# Patient Record
Sex: FEMALE | Race: OTHER | URBAN - METROPOLITAN AREA
[De-identification: names, ages, dates, MRNs, and addresses within clinical notes are randomized per-mention and may not be internally consistent; named-entity substitution may affect disease eponyms.]

---

## 2022-12-16 ENCOUNTER — LAB ENCOUNTER (OUTPATIENT)
Dept: LAB | Age: 52
End: 2022-12-16
Attending: FAMILY MEDICINE
Payer: COMMERCIAL

## 2022-12-16 ENCOUNTER — OFFICE VISIT (OUTPATIENT)
Dept: FAMILY MEDICINE CLINIC | Facility: CLINIC | Age: 52
End: 2022-12-16
Payer: COMMERCIAL

## 2022-12-16 ENCOUNTER — TELEPHONE (OUTPATIENT)
Dept: FAMILY MEDICINE CLINIC | Facility: CLINIC | Age: 52
End: 2022-12-16

## 2022-12-16 VITALS
BODY MASS INDEX: 31.05 KG/M2 | SYSTOLIC BLOOD PRESSURE: 114 MMHG | HEIGHT: 65 IN | WEIGHT: 186.38 LBS | HEART RATE: 57 BPM | TEMPERATURE: 97 F | RESPIRATION RATE: 16 BRPM | DIASTOLIC BLOOD PRESSURE: 72 MMHG | OXYGEN SATURATION: 96 %

## 2022-12-16 DIAGNOSIS — Z86.73 HISTORY OF ISCHEMIC STROKE: ICD-10-CM

## 2022-12-16 DIAGNOSIS — Z12.31 ENCOUNTER FOR SCREENING MAMMOGRAM FOR BREAST CANCER: ICD-10-CM

## 2022-12-16 DIAGNOSIS — Z85.9 HISTORY OF CANCER: Primary | ICD-10-CM

## 2022-12-16 DIAGNOSIS — Z00.00 ANNUAL PHYSICAL EXAM: ICD-10-CM

## 2022-12-16 DIAGNOSIS — Z12.11 SCREENING FOR COLON CANCER: ICD-10-CM

## 2022-12-16 LAB
ALBUMIN SERPL-MCNC: 3.4 G/DL (ref 3.4–5)
ALBUMIN/GLOB SERPL: 0.7 {RATIO} (ref 1–2)
ALP LIVER SERPL-CCNC: 96 U/L
ALT SERPL-CCNC: 23 U/L
ANION GAP SERPL CALC-SCNC: 3 MMOL/L (ref 0–18)
AST SERPL-CCNC: 13 U/L (ref 15–37)
BASOPHILS # BLD AUTO: 0.05 X10(3) UL (ref 0–0.2)
BASOPHILS NFR BLD AUTO: 0.5 %
BILIRUB SERPL-MCNC: 0.5 MG/DL (ref 0.1–2)
BUN BLD-MCNC: 12 MG/DL (ref 7–18)
BUN/CREAT SERPL: 16.2 (ref 10–20)
CALCIUM BLD-MCNC: 8.8 MG/DL (ref 8.5–10.1)
CHLORIDE SERPL-SCNC: 107 MMOL/L (ref 98–112)
CHOLEST SERPL-MCNC: 155 MG/DL (ref ?–200)
CO2 SERPL-SCNC: 31 MMOL/L (ref 21–32)
CREAT BLD-MCNC: 0.74 MG/DL
DEPRECATED RDW RBC AUTO: 45.1 FL (ref 35.1–46.3)
EOSINOPHIL # BLD AUTO: 0.41 X10(3) UL (ref 0–0.7)
EOSINOPHIL NFR BLD AUTO: 4.5 %
ERYTHROCYTE [DISTWIDTH] IN BLOOD BY AUTOMATED COUNT: 13.7 % (ref 11–15)
FASTING PATIENT LIPID ANSWER: YES
FASTING STATUS PATIENT QL REPORTED: YES
GFR SERPLBLD BASED ON 1.73 SQ M-ARVRAT: 97 ML/MIN/1.73M2 (ref 60–?)
GLOBULIN PLAS-MCNC: 4.6 G/DL (ref 2.8–4.4)
GLUCOSE BLD-MCNC: 89 MG/DL (ref 70–99)
HCT VFR BLD AUTO: 43.9 %
HDLC SERPL-MCNC: 41 MG/DL (ref 40–59)
HGB BLD-MCNC: 14.2 G/DL
IMM GRANULOCYTES # BLD AUTO: 0.02 X10(3) UL (ref 0–1)
IMM GRANULOCYTES NFR BLD: 0.2 %
LDLC SERPL CALC-MCNC: 93 MG/DL (ref ?–100)
LYMPHOCYTES # BLD AUTO: 2.07 X10(3) UL (ref 1–4)
LYMPHOCYTES NFR BLD AUTO: 22.5 %
MCH RBC QN AUTO: 29.1 PG (ref 26–34)
MCHC RBC AUTO-ENTMCNC: 32.3 G/DL (ref 31–37)
MCV RBC AUTO: 90 FL
MONOCYTES # BLD AUTO: 0.67 X10(3) UL (ref 0.1–1)
MONOCYTES NFR BLD AUTO: 7.3 %
NEUTROPHILS # BLD AUTO: 5.97 X10 (3) UL (ref 1.5–7.7)
NEUTROPHILS # BLD AUTO: 5.97 X10(3) UL (ref 1.5–7.7)
NEUTROPHILS NFR BLD AUTO: 65 %
NONHDLC SERPL-MCNC: 114 MG/DL (ref ?–130)
OSMOLALITY SERPL CALC.SUM OF ELEC: 291 MOSM/KG (ref 275–295)
PLATELET # BLD AUTO: 272 10(3)UL (ref 150–450)
POTASSIUM SERPL-SCNC: 3.7 MMOL/L (ref 3.5–5.1)
PROT SERPL-MCNC: 8 G/DL (ref 6.4–8.2)
RBC # BLD AUTO: 4.88 X10(6)UL
SODIUM SERPL-SCNC: 141 MMOL/L (ref 136–145)
TRIGL SERPL-MCNC: 114 MG/DL (ref 30–149)
TSI SER-ACNC: 4.64 MIU/ML (ref 0.36–3.74)
VLDLC SERPL CALC-MCNC: 19 MG/DL (ref 0–30)
WBC # BLD AUTO: 9.2 X10(3) UL (ref 4–11)

## 2022-12-16 PROCEDURE — 85025 COMPLETE CBC W/AUTO DIFF WBC: CPT | Performed by: FAMILY MEDICINE

## 2022-12-16 PROCEDURE — 80061 LIPID PANEL: CPT | Performed by: FAMILY MEDICINE

## 2022-12-16 PROCEDURE — 99213 OFFICE O/P EST LOW 20 MIN: CPT | Performed by: FAMILY MEDICINE

## 2022-12-16 PROCEDURE — 3008F BODY MASS INDEX DOCD: CPT | Performed by: FAMILY MEDICINE

## 2022-12-16 PROCEDURE — 36415 COLL VENOUS BLD VENIPUNCTURE: CPT | Performed by: FAMILY MEDICINE

## 2022-12-16 PROCEDURE — 99386 PREV VISIT NEW AGE 40-64: CPT | Performed by: FAMILY MEDICINE

## 2022-12-16 PROCEDURE — 3078F DIAST BP <80 MM HG: CPT | Performed by: FAMILY MEDICINE

## 2022-12-16 PROCEDURE — 3074F SYST BP LT 130 MM HG: CPT | Performed by: FAMILY MEDICINE

## 2022-12-16 PROCEDURE — 84443 ASSAY THYROID STIM HORMONE: CPT | Performed by: FAMILY MEDICINE

## 2022-12-16 PROCEDURE — 80053 COMPREHEN METABOLIC PANEL: CPT | Performed by: FAMILY MEDICINE

## 2022-12-16 RX ORDER — FLUTICASONE PROPIONATE 110 UG/1
2 AEROSOL, METERED RESPIRATORY (INHALATION) 2 TIMES DAILY
Qty: 1 EACH | Refills: 1 | Status: SHIPPED | OUTPATIENT
Start: 2022-12-16 | End: 2023-12-11

## 2022-12-16 RX ORDER — ALBUTEROL SULFATE 90 UG/1
2 AEROSOL, METERED RESPIRATORY (INHALATION) EVERY 6 HOURS PRN
Qty: 1 EACH | Refills: 1 | Status: SHIPPED | OUTPATIENT
Start: 2022-12-16

## 2022-12-16 RX ORDER — MONTELUKAST SODIUM 10 MG/1
10 TABLET ORAL DAILY
Qty: 90 TABLET | Refills: 0 | Status: SHIPPED | OUTPATIENT
Start: 2022-12-16 | End: 2023-12-11

## 2022-12-16 RX ORDER — ASPIRIN 81 MG/1
81 TABLET ORAL DAILY
Qty: 90 TABLET | Refills: 3 | Status: SHIPPED | OUTPATIENT
Start: 2022-12-16

## 2022-12-16 NOTE — TELEPHONE ENCOUNTER
Spoke with pt,  verified  Pt was seen by Dr Malina Cardoso today, she wish to initiate mychart. angelcam activation sent, also provided [employer] # in case need further assistance.      SEHA

## 2022-12-28 ENCOUNTER — TELEMEDICINE (OUTPATIENT)
Dept: FAMILY MEDICINE CLINIC | Facility: CLINIC | Age: 52
End: 2022-12-28

## 2022-12-28 DIAGNOSIS — E03.9 HYPOTHYROIDISM, UNSPECIFIED TYPE: Primary | ICD-10-CM

## 2022-12-28 PROCEDURE — 99213 OFFICE O/P EST LOW 20 MIN: CPT | Performed by: FAMILY MEDICINE

## 2022-12-28 RX ORDER — LEVOTHYROXINE SODIUM 0.03 MG/1
25 TABLET ORAL
Qty: 90 TABLET | Refills: 3 | Status: SHIPPED | OUTPATIENT
Start: 2022-12-28

## 2023-02-01 ENCOUNTER — OFFICE VISIT (OUTPATIENT)
Dept: OBGYN CLINIC | Facility: CLINIC | Age: 53
End: 2023-02-01

## 2023-02-01 VITALS — WEIGHT: 189.81 LBS | DIASTOLIC BLOOD PRESSURE: 73 MMHG | BODY MASS INDEX: 32 KG/M2 | SYSTOLIC BLOOD PRESSURE: 130 MMHG

## 2023-02-01 DIAGNOSIS — Z90.710 HISTORY OF HYSTERECTOMY: ICD-10-CM

## 2023-02-01 DIAGNOSIS — Z87.410 HISTORY OF CERVICAL DYSPLASIA: ICD-10-CM

## 2023-02-01 DIAGNOSIS — Z01.419 WOMEN'S ANNUAL ROUTINE GYNECOLOGICAL EXAMINATION: Primary | ICD-10-CM

## 2023-02-01 PROCEDURE — 3075F SYST BP GE 130 - 139MM HG: CPT | Performed by: OBSTETRICS & GYNECOLOGY

## 2023-02-01 PROCEDURE — 3078F DIAST BP <80 MM HG: CPT | Performed by: OBSTETRICS & GYNECOLOGY

## 2023-02-01 PROCEDURE — 99386 PREV VISIT NEW AGE 40-64: CPT | Performed by: OBSTETRICS & GYNECOLOGY

## 2023-02-08 ENCOUNTER — HOSPITAL ENCOUNTER (OUTPATIENT)
Dept: CT IMAGING | Facility: HOSPITAL | Age: 53
Discharge: HOME | End: 2023-02-08
Attending: FAMILY MEDICINE
Payer: COMMERCIAL

## 2023-02-08 ENCOUNTER — HOSPITAL ENCOUNTER (OUTPATIENT)
Dept: MAMMOGRAPHY | Facility: HOSPITAL | Age: 53
Discharge: HOME | End: 2023-02-08
Attending: FAMILY MEDICINE
Payer: COMMERCIAL

## 2023-02-08 DIAGNOSIS — Z86.73 HISTORY OF ISCHEMIC STROKE: ICD-10-CM

## 2023-02-08 DIAGNOSIS — Z12.31 ENCOUNTER FOR SCREENING MAMMOGRAM FOR BREAST CANCER: ICD-10-CM

## 2023-02-08 PROCEDURE — 77063 BREAST TOMOSYNTHESIS BI: CPT | Performed by: FAMILY MEDICINE

## 2023-02-08 PROCEDURE — 70450 CT HEAD/BRAIN W/O DYE: CPT | Performed by: FAMILY MEDICINE

## 2023-02-08 PROCEDURE — 77067 SCR MAMMO BI INCL CAD: CPT | Performed by: FAMILY MEDICINE

## 2023-02-24 ENCOUNTER — NURSE ONLY (OUTPATIENT)
Facility: CLINIC | Age: 53
End: 2023-02-24

## 2023-02-24 NOTE — PROGRESS NOTES
Called patient for a scheduled telephone colon screening. Spoke with pt and she states having active GI sxs. Per department protocol oV scheduled with next available provider.  Date, time, location and provider discussed with her over the phone with the assistance of [address] [facility]  Malcom Doran [phone]

## 2023-03-06 RX ORDER — MONTELUKAST SODIUM 10 MG/1
10 TABLET ORAL DAILY
Qty: 90 TABLET | Refills: 3 | Status: SHIPPED | OUTPATIENT
Start: 2023-03-06

## 2023-03-06 RX ORDER — ALBUTEROL SULFATE 90 UG/1
2 AEROSOL, METERED RESPIRATORY (INHALATION) EVERY 6 HOURS PRN
Qty: 8.5 G | Refills: 3 | Status: SHIPPED | OUTPATIENT
Start: 2023-03-06

## 2023-03-06 RX ORDER — FLUTICASONE PROPIONATE 110 UG/1
2 AEROSOL, METERED RESPIRATORY (INHALATION) 2 TIMES DAILY
Qty: 3 EACH | Refills: 3 | Status: SHIPPED | OUTPATIENT
Start: 2023-03-06

## 2023-03-07 NOTE — TELEPHONE ENCOUNTER
Refill passed per [facility] protocol.      Requested Prescriptions   Pending Prescriptions Disp Refills    FLUTICASONE PROPIONATE 110 MCG/ACT Inhalation Aerosol [Pharmacy Med Name: FLUTICASONE HFA 110MCG ORAL INH] 12 g 0     Sig: INHALE 2 PUFFS INTO THE LUNGS TWICE DAILY       Asthma & COPD Medication Protocol Passed - 3/6/2023 10:53 AM        Passed - In person appointment or virtual visit in the past 6 mos or appointment in next 3 mos     Recent Outpatient Visits              1 week ago     [address], [address], [place]    Nurse Only    1 month ago [facility] annual routine gynecological examination    [address], [address], [place] - OB/GYN Sheldon Garcia MD    Office Visit    2 months ago Hypothyroidism, unspecified type    [facility], [address] [place] [address] Jaguar Rojas MD    Telemedicine    2 months ago History of cancer    [facility], Lin Arshad MD    Office Visit          Future Appointments         Provider Department Appt Notes    In 1 week Jaguar Rojas MD [address], [address] [facility] sight problems    In 3 months FÁTIMA Kwok [facility], [address] \"Failed TCS\" Heartburn/ becomes full quickly at meals/ loss of appetite                 ALBUTEROL 108 (90 Base) MCG/ACT Inhalation Aero Soln [Pharmacy Med Name: ALBUTEROL HFA INH (200 PUFFS)8.5GM] 8.5 g 0     Sig: INHALE 2 PUFFS INTO THE LUNGS EVERY 6 HOURS AS NEEDED FOR WHEEZING       Asthma & COPD Medication Protocol Passed - 3/6/2023 10:53 AM        Passed - In person appointment or virtual visit in the past 6 mos or appointment in next 3 mos     Recent Outpatient Visits              1 week ago     [address], [address], [address] Only    1 month ago [facility] annual routine gynecological examination    [employer], [address] [facility] Jung Dennis MD    Office Visit    2 months ago Hypothyroidism, unspecified type    [facility], [address] [facility] [place] Nicky Daniel MD    Telemedicine    2 months ago History of cancer    Bianka Dobbs, [facility] Ronan Nuñez MD    Office Visit          Future Appointments         Provider Department Appt Notes    In 1 week MD [facility]ka Glass, [address] [facility]    In 3 months FÁTIMA Kirkpatrick [facility], [address], [place] \"Failed TCS\" Heartburn/ becomes full quickly at meals/ loss of appetite                 MONTELUKAST 10 MG Oral Tab [Pharmacy Med Name: MONTELUKAST 10MG TABLETS] 90 tablet 0     Sig: TAKE 1 TABLET(10 MG) BY MOUTH DAILY       Asthma & COPD Medication Protocol Passed - 3/6/2023 10:53 AM        Passed - In person appointment or virtual visit in the past 6 mos or appointment in next 3 mos     Recent Outpatient Visits              1 week ago     Bianka Dobbs, [address], [place]    Nurse Only    1 month ago [employer] annual routine gynecological examination    [facility], [address] [facility] [address], [facility]N Jung Dennis MD    Office Visit    2 months ago Hypothyroidism, unspecified type    [facility], [address] [facility] [place] Nicky Daniel MD    Telemedicine    2 months ago History of cancer    Anny Hylton MD    Office Visit          Future Appointments         Provider Department Appt Notes    In 1 week MD Bianka Glass, [facility] Damion Nuñez sight problems    In 3 months FÁTIMA Kirkpatrick, [address] \"Failed TCS\" Heartburn/ becomes full quickly at meals/ loss of appetite                     Future Appointments Provider Department Appt Notes    In 1 week Geovany Phillips MD [address], [address] [facility] sight problems    In 3 months FÁTIMA [facility], [address] \"Failed TCS\" Heartburn/ becomes full quickly at meals/ loss of appetite             Recent Outpatient Visits              1 week ago     [address], [address], [place]    Nurse Only    1 month ago [facility] annual routine gynecological examination    [address], [address] [facility] Sheree Mendoza MD    Office Visit    2 months ago Hypothyroidism, unspecified type    Van Medrano MD    Telemedicine    2 months ago History of cancer    Van Medrano MD    Office Visit

## 2023-03-15 ENCOUNTER — OFFICE VISIT (OUTPATIENT)
Dept: FAMILY MEDICINE CLINIC | Facility: CLINIC | Age: 53
End: 2023-03-15

## 2023-03-15 VITALS
HEART RATE: 64 BPM | BODY MASS INDEX: 31.82 KG/M2 | OXYGEN SATURATION: 98 % | SYSTOLIC BLOOD PRESSURE: 122 MMHG | WEIGHT: 191 LBS | DIASTOLIC BLOOD PRESSURE: 66 MMHG | RESPIRATION RATE: 18 BRPM | HEIGHT: 65 IN

## 2023-03-15 DIAGNOSIS — H53.9 VISION DISORDER: Primary | ICD-10-CM

## 2023-03-15 DIAGNOSIS — Z87.898 HISTORY OF PARESTHESIA: ICD-10-CM

## 2023-03-15 PROCEDURE — 3008F BODY MASS INDEX DOCD: CPT | Performed by: FAMILY MEDICINE

## 2023-03-15 PROCEDURE — 99214 OFFICE O/P EST MOD 30 MIN: CPT | Performed by: FAMILY MEDICINE

## 2023-03-15 PROCEDURE — 3074F SYST BP LT 130 MM HG: CPT | Performed by: FAMILY MEDICINE

## 2023-03-15 PROCEDURE — 3078F DIAST BP <80 MM HG: CPT | Performed by: FAMILY MEDICINE

## 2023-04-03 ENCOUNTER — HOSPITAL ENCOUNTER (OUTPATIENT)
Age: 53
Discharge: HOME | End: 2023-04-03
Payer: COMMERCIAL

## 2023-04-03 VITALS
DIASTOLIC BLOOD PRESSURE: 78 MMHG | HEART RATE: 67 BPM | SYSTOLIC BLOOD PRESSURE: 127 MMHG | OXYGEN SATURATION: 100 % | RESPIRATION RATE: 18 BRPM | TEMPERATURE: 98 F

## 2023-04-03 DIAGNOSIS — J45.21 MILD INTERMITTENT ASTHMA WITH EXACERBATION: ICD-10-CM

## 2023-04-03 DIAGNOSIS — Z20.822 LAB TEST NEGATIVE FOR COVID-19 VIRUS: ICD-10-CM

## 2023-04-03 DIAGNOSIS — L50.9 URTICARIA: Primary | ICD-10-CM

## 2023-04-03 LAB
S PYO AG THROAT QL: NEGATIVE
SARS-COV-2 RNA RESP QL NAA+PROBE: NOT DETECTED

## 2023-04-03 PROCEDURE — 87880 STREP A ASSAY W/OPTIC: CPT | Performed by: PHYSICIAN ASSISTANT

## 2023-04-03 PROCEDURE — 99203 OFFICE O/P NEW LOW 30 MIN: CPT | Performed by: PHYSICIAN ASSISTANT

## 2023-04-03 PROCEDURE — U0002 COVID-19 LAB TEST NON-CDC: HCPCS | Performed by: PHYSICIAN ASSISTANT

## 2023-04-03 RX ORDER — PREDNISONE 20 MG/1
40 TABLET ORAL DAILY
Qty: 10 TABLET | Refills: 0 | Status: SHIPPED | OUTPATIENT
Start: 2023-04-03 | End: 2023-04-08

## 2023-04-03 RX ORDER — FAMOTIDINE 20 MG/1
20 TABLET, FILM COATED ORAL 2 TIMES DAILY
Qty: 30 TABLET | Refills: 0 | Status: SHIPPED | OUTPATIENT
Start: 2023-04-03 | End: 2023-04-18

## 2023-04-03 RX ORDER — IPRATROPIUM BROMIDE AND ALBUTEROL SULFATE 2.5; .5 MG/3ML; MG/3ML
3 SOLUTION RESPIRATORY (INHALATION) ONCE
Status: COMPLETED | OUTPATIENT
Start: 2023-04-03 | End: 2023-04-03

## 2023-04-03 RX ORDER — FAMOTIDINE 20 MG/1
20 TABLET, FILM COATED ORAL ONCE
Status: COMPLETED | OUTPATIENT
Start: 2023-04-03 | End: 2023-04-03

## 2023-04-03 RX ORDER — DIPHENHYDRAMINE HCL 25 MG
25 TABLET ORAL EVERY 6 HOURS PRN
Qty: 60 TABLET | Refills: 0 | Status: SHIPPED | OUTPATIENT
Start: 2023-04-03

## 2023-04-03 RX ORDER — PREDNISONE 20 MG/1
60 TABLET ORAL ONCE
Status: COMPLETED | OUTPATIENT
Start: 2023-04-03 | End: 2023-04-03

## 2023-04-05 ENCOUNTER — TELEMEDICINE (OUTPATIENT)
Dept: FAMILY MEDICINE CLINIC | Facility: CLINIC | Age: 53
End: 2023-04-05

## 2023-04-05 DIAGNOSIS — T78.40XS ALLERGY, SEQUELA: ICD-10-CM

## 2023-04-05 DIAGNOSIS — L30.9 DERMATITIS: Primary | ICD-10-CM

## 2023-04-05 PROCEDURE — 99213 OFFICE O/P EST LOW 20 MIN: CPT | Performed by: FAMILY MEDICINE

## 2023-04-05 RX ORDER — LEVOCETIRIZINE DIHYDROCHLORIDE 5 MG/1
5 TABLET, FILM COATED ORAL EVERY MORNING
Qty: 30 TABLET | Refills: 0 | Status: SHIPPED | OUTPATIENT
Start: 2023-04-05

## 2023-04-05 RX ORDER — PREDNISONE 10 MG/1
TABLET ORAL
Qty: 30 TABLET | Refills: 0 | Status: SHIPPED | OUTPATIENT
Start: 2023-04-05

## 2023-04-05 RX ORDER — LORAZEPAM 0.5 MG/1
0.5 TABLET ORAL EVERY 6 HOURS PRN
Qty: 30 TABLET | Refills: 0 | Status: SHIPPED | OUTPATIENT
Start: 2023-04-05

## 2023-05-17 ENCOUNTER — OFFICE VISIT (OUTPATIENT)
Dept: ALLERGY | Facility: CLINIC | Age: 53
End: 2023-05-17

## 2023-05-17 ENCOUNTER — LAB ENCOUNTER (OUTPATIENT)
Dept: LAB | Age: 53
End: 2023-05-17
Attending: ALLERGY & IMMUNOLOGY
Payer: COMMERCIAL

## 2023-05-17 VITALS
OXYGEN SATURATION: 99 % | BODY MASS INDEX: 31.82 KG/M2 | DIASTOLIC BLOOD PRESSURE: 79 MMHG | HEART RATE: 62 BPM | WEIGHT: 191 LBS | SYSTOLIC BLOOD PRESSURE: 135 MMHG | HEIGHT: 65 IN

## 2023-05-17 DIAGNOSIS — L30.9 DERMATITIS: Primary | ICD-10-CM

## 2023-05-17 DIAGNOSIS — L50.3 DERMOGRAPHIC URTICARIA: ICD-10-CM

## 2023-05-17 DIAGNOSIS — F17.200 SMOKER: ICD-10-CM

## 2023-05-17 DIAGNOSIS — L50.1 CHRONIC IDIOPATHIC URTICARIA: ICD-10-CM

## 2023-05-17 DIAGNOSIS — Z91.018 FOOD ALLERGY: ICD-10-CM

## 2023-05-17 DIAGNOSIS — Z91.09 ENVIRONMENTAL ALLERGIES: ICD-10-CM

## 2023-05-17 PROCEDURE — 82785 ASSAY OF IGE: CPT | Performed by: ALLERGY & IMMUNOLOGY

## 2023-05-17 PROCEDURE — 99204 OFFICE O/P NEW MOD 45 MIN: CPT | Performed by: ALLERGY & IMMUNOLOGY

## 2023-05-17 PROCEDURE — 86003 ALLG SPEC IGE CRUDE XTRC EA: CPT

## 2023-05-17 PROCEDURE — 86003 ALLG SPEC IGE CRUDE XTRC EA: CPT | Performed by: ALLERGY & IMMUNOLOGY

## 2023-05-17 PROCEDURE — 36415 COLL VENOUS BLD VENIPUNCTURE: CPT | Performed by: ALLERGY & IMMUNOLOGY

## 2023-05-17 PROCEDURE — 3008F BODY MASS INDEX DOCD: CPT | Performed by: ALLERGY & IMMUNOLOGY

## 2023-05-17 PROCEDURE — 3078F DIAST BP <80 MM HG: CPT | Performed by: ALLERGY & IMMUNOLOGY

## 2023-05-17 PROCEDURE — 3075F SYST BP GE 130 - 139MM HG: CPT | Performed by: ALLERGY & IMMUNOLOGY

## 2023-05-17 RX ORDER — LEVOCETIRIZINE DIHYDROCHLORIDE 5 MG/1
5 TABLET, FILM COATED ORAL 2 TIMES DAILY
Qty: 180 TABLET | Refills: 1 | Status: SHIPPED | OUTPATIENT
Start: 2023-05-17

## 2023-05-17 NOTE — PATIENT INSTRUCTIONS
1.  Chronic urticaria with dermatographia  2-month history of intermittent hives. Positive dermatographia screen in office  Handouts on chronic urticaria provided and reviewed including majority being idiopathic in nature  Handouts on physical hives including dermatographia, scratching induced hives. Reviewed trial of symptomatic care with Xyzal, levocetirizine 5 mg once a day up to 4 times per day if needed. May consider Xolair if refractory  Recommend moisturizer twice a day to prevent dry skin itching and scratching    #2 Food allergies  Check serum IgE to almond and pumpkin. We will call with results. #3 environmental allergies  Check serum IgE profile to environmental allergens. We will call with results  Xyzal is an antihistamine for symptoms of runny nose sneezing itchy watery eyes  May add Flonase or Nasacort 2 sprays per nostril once a day if having prominent nasal congestion postnasal drip    #4 smoker. Reviewed smoking sensation options including medications as well as smoking cessation programs  Positive reinforcement provided           Orders This Visit:  Orders Placed This Encounter      [facility]      [place]      [place]      Meds This Visit:  Requested Prescriptions     Signed Prescriptions Disp Refills    levocetirizine 5 MG Oral Tab 180 tablet 1     Sig: Take 1 tablet (5 mg total) by mouth in the morning and 1 tablet (5 mg total) before bedtime.

## 2023-05-18 LAB — ALMOND IGE QN: 0.39 KUA/L (ref ?–0.1)

## 2023-05-19 ENCOUNTER — TELEPHONE (OUTPATIENT)
Dept: ALLERGY | Facility: CLINIC | Age: 53
End: 2023-05-19

## 2023-05-19 LAB
A ALTERNATA IGE QN: <0.1 KUA/L (ref ?–0.1)
A FUMIGATUS IGE QN: <0.1 KUA/L (ref ?–0.1)
AMER SYCAMORE IGE QN: 0.43 KUA/L (ref ?–0.1)
BERMUDA GRASS IGE QN: 0.79 KUA/L (ref ?–0.1)
BOXELDER IGE QN: 0.39 KUA/L (ref ?–0.1)
C HERBARUM IGE QN: <0.1 KUA/L (ref ?–0.1)
CALIF WALNUT IGE QN: 0.5 KUA/L (ref ?–0.1)
CAT DANDER IGE QN: <0.1 KUA/L (ref ?–0.1)
CMN PIGWEED IGE QN: 0.28 KUA/L (ref ?–0.1)
COMMON RAGWEED IGE QN: 0.6 KUA/L (ref ?–0.1)
COTTONWOOD IGE QN: 0.18 KUA/L (ref ?–0.1)
D FARINAE IGE QN: 0.15 KUA/L (ref ?–0.1)
D PTERONYSS IGE QN: <0.1 KUA/L (ref ?–0.1)
DOG DANDER IGE QN: <0.1 KUA/L (ref ?–0.1)
IGE SERPL-ACNC: 514 KU/L (ref 2–214)
M RACEMOSUS IGE QN: <0.1 KUA/L (ref ?–0.1)
MARSH ELDER IGE QN: 0.74 KUA/L (ref ?–0.1)
MOUSE EPITH IGE QN: <0.1 KUA/L (ref ?–0.1)
MT JUNIPER IGE QN: 0.2 KUA/L (ref ?–0.1)
P NOTATUM IGE QN: <0.1 KUA/L (ref ?–0.1)
PECAN/HICK TREE IGE QN: 0.25 KUA/L (ref ?–0.1)
ROACH IGE QN: 1.95 KUA/L (ref ?–0.1)
SALTWORT IGE QN: 0.73 KUA/L (ref ?–0.1)
TIMOTHY IGE QN: 0.68 KUA/L (ref ?–0.1)
WHITE ASH IGE QN: 0.56 KUA/L (ref ?–0.1)
WHITE ELM IGE QN: 0.39 KUA/L (ref ?–0.1)
WHITE MULBERRY IGE QN: 0.21 KUA/L (ref ?–0.1)
WHITE OAK IGE QN: 0.42 KUA/L (ref ?–0.1)

## 2023-05-19 NOTE — TELEPHONE ENCOUNTER
Pedro Luis received from [employer] in [facility] stating that Levocetizine 5 mg tabs are covered only for daily dosing not 2 times a day dosing.

## 2023-05-20 ENCOUNTER — TELEPHONE (OUTPATIENT)
Dept: ALLERGY | Facility: CLINIC | Age: 53
End: 2023-05-20

## 2023-05-20 NOTE — TELEPHONE ENCOUNTER
RN used [employer]  to go over results listed below. Sarah Jiemnez  ID#: [id number]    Left message to call back to go over results. Provided call back number and office hours. When pt returns call, please go over all results listed below. Please also notify pt that we have received a fax from [employer] [facility] stating that insurance will only cover Levocetirizine once daily and not twice daily.

## 2023-05-20 NOTE — TELEPHONE ENCOUNTER
----- Message from Fide Scruggs MD sent at 5/18/2023  2:54 PM CDT -----  Please call patient with recent serum IgE testing to almond 0.39. Recommend to avoid unless already clinically tolerating.

## 2023-05-20 NOTE — TELEPHONE ENCOUNTER
----- Message from Fide Scruggs MD sent at 5/20/2023  7:30 AM CDT -----   Please contact patient with serum IgE testing to environmental allergens.   Patient showed IgE production to grasses trees cockroach ragweed weeds

## 2023-05-20 NOTE — TELEPHONE ENCOUNTER
RN tried calling pt to go over results. Used [employer] with [employer] . RN left message requesting she call our office back. Will notify pt that Xyzal is only covered once daily and not BID. Documentation will be in Results Telephone Encounter from 5/20/2023. Closing this encounter.

## 2023-05-23 LAB — Lab: 0.43 KU/L

## 2023-05-24 NOTE — TELEPHONE ENCOUNTER
----- Message from Jd Britton MD sent at 5/24/2023  7:28 AM CDT -----  Please contact parents with recent serum IgE to pumpkin 0.43  Recommend to avoid at this time.   May consider oral challenge in office to further evaluate if no reactions over the past year to pumpkin

## 2023-05-31 NOTE — TELEPHONE ENCOUNTER
Third attempt to contact patient  MyChart message sent  Letter sent home to contact office to discuss test results listed below

## 2023-06-06 ENCOUNTER — LAB ENCOUNTER (OUTPATIENT)
Dept: LAB | Facility: HOSPITAL | Age: 53
End: 2023-06-06
Attending: INTERNAL MEDICINE
Payer: COMMERCIAL

## 2023-06-06 ENCOUNTER — OFFICE VISIT (OUTPATIENT)
Facility: CLINIC | Age: 53
End: 2023-06-06

## 2023-06-06 ENCOUNTER — TELEPHONE (OUTPATIENT)
Facility: CLINIC | Age: 53
End: 2023-06-06

## 2023-06-06 VITALS
DIASTOLIC BLOOD PRESSURE: 76 MMHG | WEIGHT: 195 LBS | HEIGHT: 65 IN | SYSTOLIC BLOOD PRESSURE: 130 MMHG | BODY MASS INDEX: 32.49 KG/M2 | HEART RATE: 63 BPM

## 2023-06-06 DIAGNOSIS — Z12.11 COLON CANCER SCREENING: ICD-10-CM

## 2023-06-06 DIAGNOSIS — R10.13 EPIGASTRIC PAIN: ICD-10-CM

## 2023-06-06 DIAGNOSIS — R11.0 NAUSEA: ICD-10-CM

## 2023-06-06 DIAGNOSIS — Z12.11 SCREENING FOR COLON CANCER: ICD-10-CM

## 2023-06-06 DIAGNOSIS — R12 HEARTBURN: Primary | ICD-10-CM

## 2023-06-06 DIAGNOSIS — Z80.0 FAMILY HISTORY OF COLON CANCER: ICD-10-CM

## 2023-06-06 PROCEDURE — 3008F BODY MASS INDEX DOCD: CPT | Performed by: NURSE PRACTITIONER

## 2023-06-06 PROCEDURE — 3078F DIAST BP <80 MM HG: CPT | Performed by: NURSE PRACTITIONER

## 2023-06-06 PROCEDURE — 83013 H PYLORI (C-13) BREATH: CPT

## 2023-06-06 PROCEDURE — 3075F SYST BP GE 130 - 139MM HG: CPT | Performed by: NURSE PRACTITIONER

## 2023-06-06 PROCEDURE — 99204 OFFICE O/P NEW MOD 45 MIN: CPT | Performed by: NURSE PRACTITIONER

## 2023-06-06 RX ORDER — POLYETHYLENE GLYCOL 3350, SODIUM CHLORIDE, SODIUM BICARBONATE, POTASSIUM CHLORIDE 420; 11.2; 5.72; 1.48 G/4L; G/4L; G/4L; G/4L
POWDER, FOR SOLUTION ORAL
Qty: 4000 ML | Refills: 0 | Status: SHIPPED | OUTPATIENT
Start: 2023-06-06

## 2023-06-06 NOTE — TELEPHONE ENCOUNTER
Scheduled for:  Colonoscopy [phone], [phone], EGD [address]  Provider Name:  Pati Sheehan  Date:  8/15/2023  Location:  [facility]  Sedation:  MAC  Time:  8:00a, (pt is aware that Angelica Perez will call the day before to confirm arrival time)  Prep:  Trilyte  Meds/Allergies Reconciled?:  Sandrine/NP reviewed. Diagnosis with codes:  Heartburn R12, Nausea R11.0, Epigastric Pain R10.13,Screening for Colon Cancer Z12.11, Family History of Colon Cancer Z80.0  Was patient informed to call insurance with codes (Y/N): Yes   Referral sent?:  Yes  [facility] or [facility] notified?:  Electronic case request was sent to Angelica Perez via [employer]. Medication Orders: N/A    Misc Orders:  N/A     Further instructions given by staff: I discussed the prep instructions with the patient which she verbally understood. Provided patient with prep instructions at the time of office visit.

## 2023-06-07 ENCOUNTER — TELEPHONE (OUTPATIENT)
Dept: GASTROENTEROLOGY | Facility: CLINIC | Age: 53
End: 2023-06-07

## 2023-06-07 DIAGNOSIS — A04.8 HELICOBACTER PYLORI (H. PYLORI) INFECTION: Primary | ICD-10-CM

## 2023-06-07 LAB — H PYLORI BREATH TEST: POSITIVE

## 2023-06-07 RX ORDER — AMOXICILLIN 500 MG/1
1000 TABLET, FILM COATED ORAL 2 TIMES DAILY
Qty: 56 TABLET | Refills: 0 | Status: SHIPPED | OUTPATIENT
Start: 2023-06-07 | End: 2023-06-21

## 2023-06-07 RX ORDER — OMEPRAZOLE 20 MG/1
20 CAPSULE, DELAYED RELEASE ORAL
Qty: 28 CAPSULE | Refills: 0 | Status: SHIPPED | OUTPATIENT
Start: 2023-06-07 | End: 2023-06-21

## 2023-06-07 RX ORDER — CLARITHROMYCIN 500 MG/1
500 TABLET, COATED ORAL 2 TIMES DAILY
Qty: 28 TABLET | Refills: 0 | Status: SHIPPED | OUTPATIENT
Start: 2023-06-07 | End: 2023-06-21

## 2023-06-07 NOTE — TELEPHONE ENCOUNTER
maxxn contacted pt with results of urea breath test (6/7/23)     natural history of H.pylori was reviewed with the patient, as well as possible complications from H.pylori including stomach cancer, gastritis, dyspepsia, anemia and ulcers. We discussed treatment options and rationale for treatment, as well as testing for eradication. The patient understands the risks/benefits/side-effects of treatment and wants to proceed. The patient understands only 80-95% of patients have eradication to initial course of abx, and therefore may need another course of abx if fails first treatment. There may be side-effects during treatment and patient should call if any problems. I have instructed patient to check H.pylori test  in 6-8 weeks to ensure eradication (order placed). Will start w/triple therapy (PPI + amoxicillin + clarithromycin) x 14 days. She verbalizes understanding of above and is in agreement with the plan.

## 2023-06-23 ENCOUNTER — TELEPHONE (OUTPATIENT)
Facility: CLINIC | Age: 53
End: 2023-06-23

## 2023-06-23 NOTE — TELEPHONE ENCOUNTER
Refill request      Not listed:  Omeprazole 20mg Capsules  Qty:28  SIG:  Take 1 capsule (20mg) by mouth twice daily before meals for 14 days    Current Outpatient Medications   Medication Sig Dispense Refill

## 2023-06-23 NOTE — TELEPHONE ENCOUNTER
RN called pharmacy to decline refill for omeprazole BID x 14 day (was used for h pylori treatment). Pt takes OTC omperazole for daily use r/t heartburn per clinic notes.

## 2023-08-03 ENCOUNTER — LAB ENCOUNTER (OUTPATIENT)
Dept: LAB | Age: 53
End: 2023-08-03
Attending: OBSTETRICS & GYNECOLOGY
Payer: COMMERCIAL

## 2023-08-03 DIAGNOSIS — A04.8 H. PYLORI INFECTION: Primary | ICD-10-CM

## 2023-08-03 PROCEDURE — 83013 H PYLORI (C-13) BREATH: CPT

## 2023-08-04 LAB — H PYLORI BREATH TEST: POSITIVE

## 2023-08-07 ENCOUNTER — TELEPHONE (OUTPATIENT)
Dept: GASTROENTEROLOGY | Facility: CLINIC | Age: 53
End: 2023-08-07

## 2023-08-07 DIAGNOSIS — A04.8 HELICOBACTER PYLORI (H. PYLORI) INFECTION: Primary | ICD-10-CM

## 2023-08-07 RX ORDER — OMEPRAZOLE 20 MG/1
20 CAPSULE, DELAYED RELEASE ORAL
Qty: 28 CAPSULE | Refills: 0 | Status: SHIPPED | OUTPATIENT
Start: 2023-08-07 | End: 2023-08-21

## 2023-08-07 RX ORDER — TETRACYCLINE HYDROCHLORIDE 500 MG/1
500 CAPSULE ORAL 4 TIMES DAILY
Qty: 56 CAPSULE | Refills: 0 | Status: SHIPPED | OUTPATIENT
Start: 2023-08-07 | End: 2023-08-21

## 2023-08-07 RX ORDER — METRONIDAZOLE 250 MG/1
250 TABLET ORAL 4 TIMES DAILY
Qty: 56 TABLET | Refills: 0 | Status: SHIPPED | OUTPATIENT
Start: 2023-08-07 | End: 2023-08-21

## 2023-08-07 RX ORDER — BISMUTH SUBSALICYLATE 262 MG/1
2 TABLET, CHEWABLE ORAL 4 TIMES DAILY
Qty: 112 TABLET | Refills: 0 | Status: SHIPPED | OUTPATIENT
Start: 2023-08-07 | End: 2023-08-21

## 2023-08-07 NOTE — TELEPHONE ENCOUNTER
Pt contacted by apn. No answer. LMTCB. Hp pos 6/7/23-treated with triple therapy  HP pos 8/4/23    Recommend treatment with quadruple therapy and eradication testing in approx 8 weeks. LMTCB. Nursing:  Could you please let her know results and my recommendations?      Thanks,  Becky

## 2023-08-08 NOTE — TELEPHONE ENCOUNTER
RN LMTCB with help of  Isidro. GI office number provided. RN also LM in [place] requesting pt call office to discuss medications that were ordered.

## 2023-08-08 NOTE — TELEPHONE ENCOUNTER
RN called and spoke to pt's daughter, Paulette Mckeon. Informed daughter that her mom's recent breath test was + for h pylori. Four medications ordered at local pharmacy, discussed how to take meds. Informed her insurance may not cover pepto bismol chewable tablets and recommended pt purchase OTC. Informed daughter that we will reach out to pt when she is due for retest in approx 8 weeks. Quisic message sent to daughter with OTC pepto bismol info. Daughter verbalized understanding and had no further questions at this time. Message sent to pt outreach to retest for h pylori in approx 8 weeks.

## 2023-08-18 ENCOUNTER — TELEPHONE (OUTPATIENT)
Facility: CLINIC | Age: 53
End: 2023-08-18

## 2023-08-18 DIAGNOSIS — R79.89 ABNORMAL TSH: Primary | ICD-10-CM

## 2023-08-18 NOTE — TELEPHONE ENCOUNTER
I returned [address] call. She is very concerned about her mother. Started tetracycline/metronidazole abx 3 days ago Tuesday evening. Symptoms worsening since then. Severe fatigue. Severe dizziness/room spinning, afraid to stand or walk worsening today. This is her second treatment. Positive breath test 6/7/2023  s/p Clarithro/amox 6/7/2023    EGD/colonoscopy report 8/15/2023 reviewed. Reassuring exam.  Mild nonerosive gastritis only, no ulcer. SUGGEST:    I advised Lucy Lynch of likely drug effect/toxicity less likely coincidental viral syndrome and vertigo. I advised immediate DC of the antibiotics. Could next try third line treatment, either Dr. Radha Ruiz choice or apply for [place]. Albertina Helm or SERVANDO, could you please address next week. ...

## 2023-08-18 NOTE — TELEPHONE ENCOUNTER
Dr. Rafael Day (on call),   Pt c/o weakness, nausea, and dizziness since EGD/colon on 8/15/23, worsened since yesterday. Pt started h pylori treatment evening of 8/15/23. Is drinking plenty of fluids and urine is light yellow. Is eating but not much. Denies diarrhea or bleeding. Has had a BM. Moderate epigastric pain, worse since procedure. Discussed that h pylori treatment is a little challenging but weakness and dizziness are unexpected. Pt is afraid to get up and walk because room is spinning. No hx of vertigo per pt. Please advise on how to proceed.    Thanks,  Samy Lenz

## 2023-08-19 NOTE — TELEPHONE ENCOUNTER
Dr. Mcclain Rem[facility]ies,   Please review pathology and give recommendations. Sent to scan.    Thank you,  Arleen Mann

## 2023-08-21 RX ORDER — LEVOFLOXACIN 500 MG/1
500 TABLET, FILM COATED ORAL EVERY 24 HOURS
Qty: 14 TABLET | Refills: 0 | Status: SHIPPED | OUTPATIENT
Start: 2023-08-21 | End: 2023-08-23

## 2023-08-21 RX ORDER — OMEPRAZOLE 20 MG/1
20 CAPSULE, DELAYED RELEASE ORAL
Qty: 28 CAPSULE | Refills: 0 | Status: SHIPPED | OUTPATIENT
Start: 2023-08-21 | End: 2023-09-04

## 2023-08-21 RX ORDER — AMOXICILLIN 500 MG/1
1000 TABLET, FILM COATED ORAL 2 TIMES DAILY
Qty: 56 TABLET | Refills: 0 | Status: SHIPPED | OUTPATIENT
Start: 2023-08-21 | End: 2023-09-04

## 2023-08-21 NOTE — TELEPHONE ENCOUNTER
Contacted by triage GI RN at 5:45. She informed me she called the patient/daughter after 5 PM this evening to let her know Dr. Konstantin Maxwell recommendations for a different h.pylori regimen. GI triage RN informed me of patient's symptoms on-going for some symptoms of dizziness and when called this evening noted to have been doing the same/still with symptoms despite stopping regimen as was instructed to do by on call GI MD over the weekend. GI triage RN asking for what patient should do. I discussed since it sounds like she is the same and stable, can likely wait for any further direction from her primary GI physician tomorrow though I suspect patient will need to see her primary physician regarding this. Can present to urgent/immediate care or an ED this evening if there is drastic change, though sounds like this is not likely required given same/stable symptoms over the last at least 6 days.     Raine Rocha MD  [address] - Gastroenterology  8/21/2023  6:17 PM

## 2023-08-21 NOTE — TELEPHONE ENCOUNTER
Can try levofloxacin based Hpylori treatment and if still not clear can try to apply for [place]  Orders placed    Thank you.

## 2023-08-21 NOTE — TELEPHONE ENCOUNTER
Dr. Sarabjit Christianson,   I spoke to pt's daughter this evening to tell her about the new h pylori meds. Daughter states pt's symptoms have not improved at all since stopping previous meds Friday. Pt seeing PCP on Wednesday. I spoke to Dr. Tristan Encarncaion; he recommended PCP or urgent care follow up. I relayed this to daughter and told her to hold off on starting new h pylori regimen until pt is feeling better. Daughter agreeable to plan. Let me know if you have any other recommendations?   Thanks,  Eryn Rico

## 2023-08-21 NOTE — TELEPHONE ENCOUNTER
Dr. Tom Louise (on call),   Regarding Jesus Johnson pt. I spoke to pt's daughter to tell her new h pylori regimen was ordered r/t previous complaints of room spinning/dizziness since EGD/colon on 8/15/23. Pt also started h pylori meds right after procedure. Dr. Larissa Rees was on call Friday and spoke to daughter, instructed her to stop meds (see his note below). Pt has been holding meds since Friday afternoon and symptoms have not improved at all. She can tolerate sitting ok but any movements cause dizziness. I will message  but do you think pt should go to urgent care or ER? Please advise.   Thanks,  Lc Carbajal

## 2023-08-22 NOTE — TELEPHONE ENCOUNTER
Noted and appreciated  Agree if worsening go to urgent care/ER  Otherwise see PCP as scheduled  Once feeling better can start Hpylori treatment

## 2023-08-23 ENCOUNTER — LAB ENCOUNTER (OUTPATIENT)
Dept: LAB | Age: 53
End: 2023-08-23
Attending: FAMILY MEDICINE
Payer: COMMERCIAL

## 2023-08-23 PROCEDURE — 80053 COMPREHEN METABOLIC PANEL: CPT | Performed by: FAMILY MEDICINE

## 2023-08-23 PROCEDURE — 36415 COLL VENOUS BLD VENIPUNCTURE: CPT | Performed by: FAMILY MEDICINE

## 2023-08-23 PROCEDURE — 84443 ASSAY THYROID STIM HORMONE: CPT | Performed by: FAMILY MEDICINE

## 2023-08-29 ENCOUNTER — TELEPHONE (OUTPATIENT)
Facility: CLINIC | Age: 53
End: 2023-08-29

## 2023-10-26 ENCOUNTER — TELEPHONE (OUTPATIENT)
Facility: CLINIC | Age: 53
End: 2023-10-26

## 2023-10-26 DIAGNOSIS — A04.8 HELICOBACTER PYLORI (H. PYLORI) INFECTION: Primary | ICD-10-CM

## 2023-10-26 NOTE — TELEPHONE ENCOUNTER
Patient outreach message received:    Message sent to pt outreach to retest for h pylori in approx 8 weeks

## 2023-10-30 RX ORDER — LEVOFLOXACIN 500 MG/1
500 TABLET, FILM COATED ORAL EVERY 24 HOURS
Qty: 14 TABLET | Refills: 0 | Status: SHIPPED | OUTPATIENT
Start: 2023-10-30

## 2023-10-30 RX ORDER — OMEPRAZOLE 20 MG/1
20 CAPSULE, DELAYED RELEASE ORAL
Qty: 28 CAPSULE | Refills: 0 | Status: SHIPPED | OUTPATIENT
Start: 2023-10-30 | End: 2023-11-13

## 2023-10-30 RX ORDER — AMOXICILLIN 500 MG/1
1000 TABLET, FILM COATED ORAL 2 TIMES DAILY
Qty: 56 TABLET | Refills: 0 | Status: SHIPPED | OUTPATIENT
Start: 2023-10-30 | End: 2023-11-13

## 2023-10-30 NOTE — TELEPHONE ENCOUNTER
Per Dr. Jessy Quach note 8/2023, she was prescribed levo based triple therapy:    Levofloxacin-based triple therapy (second line) (tx 10-14 days)  1. Levofloxacin 500mg PO daily  2. Amoxicillin 1000mg twice a day  3. Standard dose PPI BID    Above sent e-scribe and order for hp testing placed for eradication. Nursing:  Please let her know.     ThanksBecky

## 2023-10-30 NOTE — TELEPHONE ENCOUNTER
Trilla    I spoke to patient's daughter (ok per release)  Notified due for testing for eradication. She told me that patient didn't take it yet because it was changed due to having vertigo and then she went on vacation. She wanted to verify which medications she is to take but it isn't clear. Dr. Sanjuana Villa had ordered levofloxacin which was discontinued and then there is an order for amoxicillin.     Please advise on what she should be taking so can start treatment-if needs to do treatment with Levofloxacin (which is what it looks like) would need new order because PCP discontinued it back in August.    Thank you

## 2023-10-31 NOTE — TELEPHONE ENCOUNTER
RN called and spoke to pt's daughter. Informed daughter that h pylori treatment was re-ordered at pharmacy. Daughter states she picked up two antibiotics but was not able to get omeprazole. Discussed that insurance may have denied it r/t two recent fills. Instructed pt to use the omeprazole as dispensed from before and supplement with OTC if needed (20 mg BID x 14 days). Informed daughter that office will contact pt in 8 weeks when due for h pylori eradication testing. Daughter verbalized understanding. Recall for h pylor eradication testing in 8 weeks per APN. Message sent to pt outreach.

## 2023-11-10 ENCOUNTER — TELEPHONE (OUTPATIENT)
Facility: CLINIC | Age: 53
End: 2023-11-10

## 2023-11-10 NOTE — TELEPHONE ENCOUNTER
1st,overdue reminder letter sent to patient via my chart.     Helicobacter Pylori Breath Test, Adult (Order #[id number]) on 8/7/23

## 2023-12-22 ENCOUNTER — TELEPHONE (OUTPATIENT)
Facility: CLINIC | Age: 53
End: 2023-12-22

## 2023-12-22 NOTE — TELEPHONE ENCOUNTER
[employer]     Aubrey/ [id number]     used below    Called pt x2, left message to call back    CSS:  Please transfer to RN ext [phone] if patient calls back.  Thank you.

## 2023-12-26 NOTE — TELEPHONE ENCOUNTER
RN LMTCB with help of  Tanya [id number]. GI office number provided. H pylori breath test already ordered in system.

## 2024-01-05 NOTE — TELEPHONE ENCOUNTER
I spoke to patient using  [employer] ID # [id number]  Patient aware she is due for H Pylori Breath test  Aware order placed.  Reviewed how to complete the test and to hold ppi 2 weeks prior to going for this test.  Patient voiced understanding and had no questions.

## 2024-01-31 RX ORDER — ASPIRIN 81 MG/1
81 TABLET ORAL DAILY
Qty: 90 TABLET | Refills: 3 | Status: SHIPPED | OUTPATIENT
Start: 2024-01-31

## 2024-01-31 NOTE — TELEPHONE ENCOUNTER
Refill passed per [facility] protocol.  Requested Prescriptions   Pending Prescriptions Disp Refills    ASPIRIN LOW DOSE 81 MG Oral Tab EC [Pharmacy Med Name: ASPIRIN 81MG EC LOW DOSE TABLETS] 90 tablet 3     Sig: TAKE 1 TABLET BY MOUTH DAILY       Aspirin Protocol Passed - 1/30/2024  7:55 PM        Passed - In person appointment or virtual visit in the past 6 mos or appointment in next 3 mos     Recent Outpatient Visits              5 months ago Anxiety    [facility], Radha Mata MD    Office Visit    7 months ago Heartburn    [facility], Sandrine Calero APRN    Office Visit    8 months ago Dermatitis    [facility], Yo Pritchard MD    Office Visit    10 months ago Dermatitis    [facility], Radha Mata MD    Telemedicine    10 months ago Vision disorder    [facility], Radha Mata MD    Office Visit                         Recent Outpatient Visits              5 months ago [facility]    Office Visit    7 months ago Heartburn    [facility], Sandrine Calero APRN    Office Visit    8 months ago Dermatitis    [facility], Yo Pritchard MD    Office Visit    10 months ago [facility]ermatitis    [facility]    [facility]    10 months ago [facility] disorder    [facility]    [facility]

## 2024-02-01 NOTE — TELEPHONE ENCOUNTER
Pharmacy: Plan does not covered. Please send alternative- [employer] for insurance coverage.    Rx: Fluticasone HFA 110MCG Oral INH  Qty: 36  Sig: Inhale 2 puffs into the lungs twice daily

## 2024-02-02 RX ORDER — FLUTICASONE PROPIONATE 110 UG/1
2 AEROSOL, METERED RESPIRATORY (INHALATION) 2 TIMES DAILY
Qty: 3 EACH | Refills: 3 | Status: SHIPPED | OUTPATIENT
Start: 2024-02-02

## 2024-02-02 NOTE — TELEPHONE ENCOUNTER
Refill passed per [facility] protocol.  Requested Prescriptions   Pending Prescriptions Disp Refills    fluticasone propionate 110 MCG/ACT Inhalation Aerosol 3 each 3     Sig: Inhale 2 puffs into the lungs 2 (two) times daily.       Asthma & COPD Medication Protocol Passed - 2/1/2024  4:17 PM        Passed - In person appointment or virtual visit in the past 6 mos or appointment in next 3 mos     Recent [facility]              [facility]    [facility]    8 months ago Heartburn    [facility] Sandrine Calero APRN    Office Visit    8 months ago [facility]ermatitis    [facility]    Office Visit    10 months ago Dermatitis    [facility]    [facility]    10 months ago Vision disorder    [facility]    [facility] Visit                         Recent Outpatient Visits              5 months ago [facility]xiety    [facility]    Office Visit    8 months ago Heartburn    [facility], Sandrine Calero APRN    Office Visit    8 months ago [facility]ermatitis    [facility]    [facility] Visit    10 months ago Dermatitis    [facility]    [facility]    10 months ago Vision disorder    [facility]    [facility] Visit

## 2024-02-08 ENCOUNTER — TELEPHONE (OUTPATIENT)
Facility: CLINIC | Age: 54
End: 2024-02-08

## 2024-02-08 NOTE — TELEPHONE ENCOUNTER
1st reminder letter sent out via mail and [employer] for the following:   Helicobacter Pylori Breath Test, Adult (Order #[id number]) on 10/30/23

## 2024-03-19 RX ORDER — ALBUTEROL SULFATE 90 UG/1
2 AEROSOL, METERED RESPIRATORY (INHALATION) EVERY 6 HOURS PRN
Qty: 8.5 G | Refills: 3 | Status: SHIPPED | OUTPATIENT
Start: 2024-03-19

## 2024-03-20 NOTE — TELEPHONE ENCOUNTER
Please review. Protocol Failed or has no protocol   Requested Prescriptions   Pending Prescriptions Disp Refills    ALBUTEROL 108 (90 Base) MCG/ACT Inhalation Aero Soln [Pharmacy Med Name: ALBUTEROL HFA INH (200 PUFFS) 8.5GM] 8.5 g 3     Sig: INHALE 2 PUFFS INTO THE LUNGS EVERY 6 HOURS AS NEEDED FOR WHEEZING       Asthma & COPD Medication Protocol Failed - 3/17/2024  8:34 PM        Failed - Asthma Action Score greater than or equal to 20        Failed - AAP/ACT given in last 12 months     No data recorded  No data recorded  No data recorded  No data recorded          Passed - Appointment in past 6 or next 3 months      Recent Out[facility]              [facility]    [facility]    9 months ago Heartburn    [facility], Sandrine Calero APRN    Office Visit    10 months ago [facility]    [facility]    11 months ago [facility]    [facility]    1 year ago [facility]    [facility]                          Recent Outpatient Visits              6 months ago [facility]    [facility]    9 months ago Heartburn    [facility], Sandrine Calero APRN    Office Visit    10 months ago [facility]    [facility]    11 months ago [facility]    [facility]    1 year ago [facility]    [facility]

## 2024-04-16 ENCOUNTER — HOSPITAL ENCOUNTER (EMERGENCY)
Facility: HOSPITAL | Age: 54
Discharge: HOME | End: 2024-04-17
Attending: EMERGENCY MEDICINE
Payer: MEDICAID

## 2024-04-16 DIAGNOSIS — J45.901 MILD ASTHMA WITH ACUTE EXACERBATION, UNSPECIFIED WHETHER PERSISTENT (HCC): Primary | ICD-10-CM

## 2024-04-16 PROCEDURE — 94645 CONT INHLJ TX EACH ADDL HOUR: CPT

## 2024-04-16 PROCEDURE — 99284 EMERGENCY DEPT VISIT MOD MDM: CPT

## 2024-04-16 PROCEDURE — 94644 CONT INHLJ TX 1ST HOUR: CPT

## 2024-04-16 RX ORDER — PREDNISONE 20 MG/1
60 TABLET ORAL ONCE
Status: COMPLETED | OUTPATIENT
Start: 2024-04-16 | End: 2024-04-16

## 2024-04-17 VITALS
RESPIRATION RATE: 20 BRPM | HEIGHT: 65 IN | OXYGEN SATURATION: 99 % | BODY MASS INDEX: 32.49 KG/M2 | DIASTOLIC BLOOD PRESSURE: 69 MMHG | HEART RATE: 68 BPM | WEIGHT: 195 LBS | TEMPERATURE: 98 F | SYSTOLIC BLOOD PRESSURE: 128 MMHG

## 2024-04-17 RX ORDER — ALBUTEROL SULFATE 2.5 MG/3ML
2.5 SOLUTION RESPIRATORY (INHALATION) EVERY 4 HOURS PRN
Qty: 30 EACH | Refills: 0 | Status: SHIPPED | OUTPATIENT
Start: 2024-04-17 | End: 2024-05-17

## 2024-04-17 RX ORDER — PREDNISONE 20 MG/1
60 TABLET ORAL DAILY
Qty: 15 TABLET | Refills: 0 | Status: SHIPPED | OUTPATIENT
Start: 2024-04-17 | End: 2024-04-22

## 2024-04-17 RX ORDER — ALBUTEROL SULFATE 90 UG/1
2 AEROSOL, METERED RESPIRATORY (INHALATION) EVERY 4 HOURS PRN
Qty: 1 EACH | Refills: 0 | Status: SHIPPED | OUTPATIENT
Start: 2024-04-17 | End: 2024-05-17

## 2024-04-17 NOTE — ED INITIAL ASSESSMENT (HPI)
Sent home from work today for asthma symptoms, went to urgent care and had x2 albuterol nebulizer treatments    prescribed steroids and albuterol inhaler, pharmacy did not have medication    +SOB on exertion, 96% SpO2, afebrile

## 2024-04-17 NOTE — ED PROVIDER NOTES
Patient Seen in: [facility] Emergency Department    History     Chief Complaint   Patient presents with    Asthma       HPI    53-year-old female presents ER with complaint of shortness of breath.  Patient with past medical history of asthma.  Patient states that her nebulizer machine broke.  Patient was seen in urgent care earlier today and given 2 nebulizer treatments.  Patient was never given a prescription for inhaler or steroids.  Patient oxygen saturation 96% on room air    History reviewed.   Past Medical History:    Asthma (HCC)    Thyroid disease       History reviewed.   Past Surgical History:   Procedure Laterality Date          Hernia surgery      Hysterectomy      EDGARDO with left salpingo-oophorectomy due to cervical MERISSA II, fibroids/bleeding.  Done in [place]    Hysterectomy N/A     Removal gallbladder           Medications :  (Not in a hospital admission)       Family History   Problem Relation Age of Onset    Cancer Father         Lung    Cancer Maternal Grandmother     Cancer Maternal Grandfather         Liver    Cancer Paternal Grandmother     Breast Cancer Paternal Aunt     Breast Cancer Cousin     Cancer Cousin         Stomach cancer    Colon Cancer Paternal Aunt     Ovarian Cancer Neg     Uterine Cancer Neg        Smoking Status:   Social History     Socioeconomic History    Marital status: Single   Tobacco Use    Smoking status: Former     Current packs/day: 0.50     Types: Cigarettes     Passive exposure: Current    Smokeless tobacco: Never   Vaping Use    Vaping status: Never Used   Substance and Sexual Activity    Alcohol use: Yes     Comment: occasionally    Drug use: Never       ROS  All pertinent positives for the review of systems are mentioned in the HPI  All other organ systems are reviewed and are negative.    Constitutional and vital signs reviewed.      Social History and Family History elements reviewed from today, pertinent positives to the presenting  problem noted.    Physical Exam     ED Triage Vitals [04/16/24 2128]   /83   Pulse 79   Resp 24   Temp 97.9 °F (36.6 °C)   Temp src Oral   SpO2 96 %   O2 Device None (Room air)       All measures to prevent infection transmission during my interaction with the patient were taken. The patient was already wearing a droplet mask on my arrival to the room. Personal protective equipment including droplet mask, eye protection, and gloves were worn throughout the duration of the exam.  Handwashing was performed prior to and after the exam.  Stethoscope and any equipment used during my examination was cleaned with super sani-cloth germicidal wipes following the exam.     Physical Exam  Vitals and nursing note reviewed.   Constitutional:       Appearance: She is well-developed.   HENT:      Head: Normocephalic and atraumatic.      Right Ear: External ear normal.      Left Ear: External ear normal.      Nose: Nose normal.   Eyes:      Conjunctiva/sclera: Conjunctivae normal.      Pupils: Pupils are equal, round, and reactive to light.   Cardiovascular:      Rate and Rhythm: Normal rate and regular rhythm.      Heart sounds: Normal heart sounds.   Pulmonary:      Effort: Pulmonary effort is normal.      Breath sounds: Examination of the right-lower field reveals wheezing. Examination of the left-lower field reveals wheezing. Wheezing present.   Abdominal:      General: Bowel sounds are normal.      Palpations: Abdomen is soft.   Musculoskeletal:         General: Normal range of motion.      Cervical back: Normal range of motion and neck supple.   Skin:     General: Skin is warm and dry.   Neurological:      Mental Status: She is alert and oriented to person, place, and time.      Deep Tendon Reflexes: Reflexes are normal and symmetric.   Psychiatric:         Behavior: Behavior normal.         Thought Content: Thought content normal.         Judgment: Judgment normal.         ED Course      Labs Reviewed - No data to  display      Imaging Results Available and Reviewed while in ED: No results found.  ED Medications Administered:   Medications   albuterol (Ventolin) (5 MG/ML) 0.5% nebulizer solution 10 mg (10 mg Nebulization Given 4/16/24 2229)   ipratropium (Atrovent) 0.02 % nebulizer solution 0.5 mg (0.5 mg Nebulization Given 4/16/24 2229)   predniSONE (Deltasone) tab 60 mg (60 mg Oral Given 4/16/24 2219)   albuterol (Ventolin) (5 MG/ML) 0.5% nebulizer solution 10 mg (10 mg Nebulization Given 4/16/24 2322)         MDM     Vitals:    04/16/24 2128   BP: 143/83   Pulse: 79   Resp: 24   Temp: 97.9 °F (36.6 °C)   TempSrc: Oral   SpO2: 96%   Weight: 88.5 kg   Height: 165.1 cm (5' 5\")     *I personally reviewed and interpreted all ED vitals.  I also personally reviewed all labs and imaging if ordered    Pulse Ox: 96%, Room air, Normal     Monitor Interpretation:   normal sinus rhythm    Differential Diagnosis/ Diagnostic Considerations: Asthma exacerbation, URI, cough.    Medical Record Review: I personally reviewed available prior medical records for any recent pertinent discharge summaries, testing, and procedures and reviewed those reports.    Complicating Factors: The patient already has does not have any pertinent problems on file. to contribute to the complexity of this ED evaluation.    Medical Decision Making  49-year-old female presents ER with complaint of asthma exacerbation.  Patient received 2 breathing treatments in the ER and breath sounds improved.  Discussed case with respiratory therapist.  Patient given a home nebulizer machine since hers is broken as well as a prescription for prednisone, albuterol, and albuterol solution.  Patient's son bedside made aware of discharge planning disposition to follow-up with the patient PCP if symptoms continue.    Amount and/or Complexity of Data Reviewed  Independent Historian:      Details: Medical history obtained from son because he is Serbian-speaking only.  Patient with  longstanding history of asthma but her nebulizer machine broke 1 year ago  External Data Reviewed: notes.     Details: Outpatient notes reviewed.  Patient was given 2 DuoNebs as well as methylprednisolone IM at the urgent care.    Risk  Prescription drug management.        Condition upon leaving the department: Stable    Disposition and Plan     Clinical Impression:  1. Mild asthma with acute exacerbation, unspecified whether persistent (HCC)        Disposition:  Discharge    Follow-up:  No follow-up provider specified.    Medications Prescribed:  Current Discharge Medication List        START taking these medications    Details   predniSONE 20 MG Oral Tab Take 3 tablets (60 mg total) by mouth daily for 5 days.  Qty: 15 tablet, Refills: 0      !! albuterol 108 (90 Base) MCG/ACT Inhalation Aero Soln Inhale 2 puffs into the lungs every 4 (four) hours as needed.  Qty: 1 each, Refills: 0      albuterol (2.5 MG/3ML) 0.083% Inhalation Nebu Soln Take 3 mL (2.5 mg total) by nebulization every 4 (four) hours as needed for Wheezing or Shortness of Breath.  Qty: 30 each, Refills: 0       !! - Potential duplicate medications found. Please discuss with provider.

## 2024-04-29 DIAGNOSIS — E03.9 HYPOTHYROIDISM, UNSPECIFIED TYPE: Primary | ICD-10-CM

## 2024-04-30 NOTE — TELEPHONE ENCOUNTER
Please advise patient that thyroid testing needs to be completed prior to refill of medication. Last TSH was checked on 8/23/23 and was abnormal

## 2024-04-30 NOTE — TELEPHONE ENCOUNTER
Please review. Rx failed/no protocol.    No Active/ Future labs pended for TSH. - last TSH draw was 08/23/2023.    Requested Prescriptions   Pending Prescriptions Disp Refills    LEVOTHYROXINE 25 MCG Oral Tab [Pharmacy Med Name: LEVOTHYROXINE 0.025MG (25MCG) TAB] 90 tablet 3     Sig: TAKE 1 TABLET(25 MCG) BY MOUTH BEFORE BREAKFAST       Thyroid Medication Protocol Failed - 4/29/2024  6:17 AM        Failed - Last TSH value is normal     Lab Results   Component Value Date    TSH 5.180 (H) 08/23/2023                 Passed - TSH in past 12 months        Passed - In person appointment or virtual visit in the past 12 mos or appointment in next 3 mos     [facility]              [facility]    [facility]    10 months ago [facility]    [facility] Visit    11 months ago [facility]    [facility]    1 year ago [facility]    [facility]    1 year ago [facility]    [facility]                             Recent Outpatient Visits              8 months ago [facility]    [facility] Visit    10 months ago Heartburn    [facility], Sandrine Calero APRN    Office Visit    11 months ago [facility]    [facility]    1 year ago [facility]    [facility]    1 year ago [facility]    Office Visit

## 2024-05-03 RX ORDER — LEVOTHYROXINE SODIUM 0.03 MG/1
25 TABLET ORAL
Qty: 90 TABLET | Refills: 3 | OUTPATIENT
Start: 2024-05-03

## 2024-05-03 NOTE — TELEPHONE ENCOUNTER
Refusing Levothyroxine: per notes below patient must complete labs before refill. Sent [employer] message-was read 05/01/2024 and left message to complete labs.

## 2024-05-15 ENCOUNTER — LAB ENCOUNTER (OUTPATIENT)
Dept: LAB | Age: 54
End: 2024-05-15
Attending: FAMILY MEDICINE

## 2024-05-15 ENCOUNTER — OFFICE VISIT (OUTPATIENT)
Dept: FAMILY MEDICINE CLINIC | Facility: CLINIC | Age: 54
End: 2024-05-15

## 2024-05-15 VITALS
OXYGEN SATURATION: 96 % | WEIGHT: 192.81 LBS | HEIGHT: 65 IN | HEART RATE: 62 BPM | RESPIRATION RATE: 16 BRPM | BODY MASS INDEX: 32.12 KG/M2 | DIASTOLIC BLOOD PRESSURE: 68 MMHG | SYSTOLIC BLOOD PRESSURE: 114 MMHG

## 2024-05-15 DIAGNOSIS — J45.40 MODERATE PERSISTENT ASTHMA WITHOUT COMPLICATION (HCC): ICD-10-CM

## 2024-05-15 DIAGNOSIS — Z12.31 SCREENING MAMMOGRAM FOR BREAST CANCER: Primary | ICD-10-CM

## 2024-05-15 DIAGNOSIS — M79.671 FOOT PAIN, RIGHT: ICD-10-CM

## 2024-05-15 LAB — TSI SER-ACNC: 3.23 MIU/ML (ref 0.55–4.78)

## 2024-05-15 PROCEDURE — 90471 IMMUNIZATION ADMIN: CPT | Performed by: FAMILY MEDICINE

## 2024-05-15 PROCEDURE — 90677 PCV20 VACCINE IM: CPT | Performed by: FAMILY MEDICINE

## 2024-05-15 PROCEDURE — 3078F DIAST BP <80 MM HG: CPT | Performed by: FAMILY MEDICINE

## 2024-05-15 PROCEDURE — 3008F BODY MASS INDEX DOCD: CPT | Performed by: FAMILY MEDICINE

## 2024-05-15 PROCEDURE — 84443 ASSAY THYROID STIM HORMONE: CPT

## 2024-05-15 PROCEDURE — 3074F SYST BP LT 130 MM HG: CPT | Performed by: FAMILY MEDICINE

## 2024-05-15 PROCEDURE — 99495 TRANSJ CARE MGMT MOD F2F 14D: CPT | Performed by: FAMILY MEDICINE

## 2024-05-15 PROCEDURE — 36415 COLL VENOUS BLD VENIPUNCTURE: CPT

## 2024-05-15 RX ORDER — BUDESONIDE AND FORMOTEROL FUMARATE DIHYDRATE 160; 4.5 UG/1; UG/1
2 AEROSOL RESPIRATORY (INHALATION) 2 TIMES DAILY
Qty: 1 EACH | Refills: 0 | Status: SHIPPED | OUTPATIENT
Start: 2024-05-15 | End: 2025-05-15

## 2024-05-15 NOTE — PROGRESS NOTES
Subjective:   Patient ID: Maribel Sorto is a 53 year old female.    HPI    History/Other:   Review of Systems  Current Outpatient Medications   Medication Sig Dispense Refill    Budesonide-Formoterol Fumarate 160-4.5 MCG/ACT Inhalation Aerosol Inhale 2 puffs into the lungs 2 (two) times daily. 1 each 0    albuterol 108 (90 Base) MCG/ACT Inhalation Aero Soln Inhale 2 puffs into the lungs every 4 (four) hours as needed. 1 each 0    albuterol (2.5 MG/3ML) 0.083% Inhalation Nebu Soln Take 3 mL (2.5 mg total) by nebulization every 4 (four) hours as needed for Wheezing or Shortness of Breath. 30 each 0    albuterol 108 (90 Base) MCG/ACT Inhalation Aero Soln Inhale 2 puffs into the lungs every 6 (six) hours as needed for Wheezing. 8.5 g 3    aspirin (ASPIRIN LOW DOSE) 81 MG Oral Tab EC Take 1 tablet (81 mg total) by mouth daily. 90 tablet 3    diphenhydrAMINE HCl (BENADRYL ALLERGY) 25 MG Oral Tab Take 1 tablet (25 mg total) by mouth every 6 (six) hours as needed for Itching or Allergies. 60 tablet 0    montelukast 10 MG Oral Tab Take 1 tablet (10 mg total) by mouth daily. 90 tablet 3    levothyroxine (SYNTHROID) 25 MCG Oral Tab Take 1 tablet (25 mcg total) by mouth before breakfast. 90 tablet 3     Allergies:  Allergies   Allergen Reactions    Food HIVES     Almonds     Seasonal ITCHING       Objective:   Physical Exam    Assessment & Plan:   1. Screening mammogram for breast cancer    2. Foot pain, right        Orders Placed This Encounter   Procedures    Prevnar 20 (PCV20) [92367]       Meds This Visit:  Requested Prescriptions     Signed Prescriptions Disp Refills    Budesonide-Formoterol Fumarate 160-4.5 MCG/ACT Inhalation Aerosol 1 each 0     Sig: Inhale 2 puffs into the lungs 2 (two) times daily.       Imaging & Referrals:  PCV20 VACCINE FOR INTRAMUSCULAR USE  PODIATRY - INTERNAL  [facility] BRENT 2D+3D SCREENING BILAT (CPT=77067/98248)

## 2024-05-15 NOTE — PROGRESS NOTES
Subjective:   Maribel Sorto is a 53 year old female who presents for hospital follow up.   She was discharged from [facility]  to Home   Admit Date: 5/7  Discharge Date: 5/10  Hospital Discharge Diagnosis: Asthma exacerbation    Interactive contact within 2 business days post discharge first initiated on Date: 5/11/2024    During the visit, the following was completed:  Obtained and reviewed discharge summary, continuity of care documents, and Hospitalist notes  Reviewed Labs (CBC, CMP), Labs (Cardiac markers), and X-Ray radiology results    HPI: doing well now using symbicort also   Using albuterol still once a day     History/Other:   Current Medications:  Medication Reconciliation:  I am aware of an inpatient discharge within the last 30 days.  The discharge medication list has been reconciled with the patient's current medication list and reviewed by me.  See medication list for additions of new medication, and changes to current doses of medications and discontinued medications.  Outpatient Medications Marked as Taking for the 5/15/24 encounter (Office Visit) with Radha Lemon MD   Medication Sig    Budesonide-Formoterol Fumarate 160-4.5 MCG/ACT Inhalation Aerosol Inhale 2 puffs into the lungs 2 (two) times daily.    albuterol 108 (90 Base) MCG/ACT Inhalation Aero Soln Inhale 2 puffs into the lungs every 4 (four) hours as needed.    albuterol (2.5 MG/3ML) 0.083% Inhalation Nebu Soln Take 3 mL (2.5 mg total) by nebulization every 4 (four) hours as needed for Wheezing or Shortness of Breath.    albuterol 108 (90 Base) MCG/ACT Inhalation Aero Soln Inhale 2 puffs into the lungs every 6 (six) hours as needed for Wheezing.    aspirin (ASPIRIN LOW DOSE) 81 MG Oral Tab EC Take 1 tablet (81 mg total) by mouth daily.    diphenhydrAMINE HCl (BENADRYL ALLERGY) 25 MG Oral Tab Take 1 tablet (25 mg total) by mouth every 6 (six) hours as needed for Itching or Allergies.    montelukast 10 MG Oral Tab  Take 1 tablet (10 mg total) by mouth daily.    levothyroxine (SYNTHROID) 25 MCG Oral Tab Take 1 tablet (25 mcg total) by mouth before breakfast.       Review of Systems  GENERAL: feels well otherwise  SKIN: has calus on the toe that hurts EYES: denies blurred vision or double vision  HEENT: denies nasal congestion, sinus pain or ST  LUNGS: denies shortness of breath with exertion  CARDIOVASCULAR: denies chest pain on exertion  GI: denies abdominal pain, denies heartburn  : denies dysuria, vaginal discharge or itching, no complaint of urinary incontinence   MUSCULOSKELETAL: denies back pain  NEURO: denies headaches  PSYCHE: denies depression or anxiety  HEMATOLOGIC: denies hx of anemia  ENDOCRINE: denies thyroid history  ALL/ASTHMA: denies hx of allergy or asthma    Objective:   Physical Exam  General Appearance:  Alert, cooperative, no distress, appears stated age   Head:  Normocephalic, without obvious abnormality, atraumatic   Eyes:  PERRL, conjunctiva/corneas clear, EOM's intact both eyes   Ears:  Normal TM's and external ear canals, both ears   Nose: Nares normal, septum midline,mucosa normal, no drainage or sinus tenderness   Throat: Lips, mucosa, and tongue normal; teeth and gums normal   Neck: Supple, symmetrical, trachea midline, no adenopathy;  thyroid: not enlarged, symmetric, no tenderness/mass/nodules; no carotid bruit or JVD   Back:   Symmetric, no curvature, ROM normal, no CVA tenderness   Lungs:   Clear to auscultation bilaterally, respirations unlabored   Heart:  Regular rate and rhythm, S1 and S2 normal, no murmur, rub, or gallop   Abdomen:   Soft, non-tender, bowel sounds active all four quadrants,  no masses, no organomegaly   Pelvic: Deferred   Extremities: Extremities normal, atraumatic, no cyanosis or edema   Pulses: 2+ and symmetric   Skin: Skin color, texture, turgor normal, no rashes or lesions calus on the foot    Lymph nodes: Cervical, supraclavicular, and axillary nodes normal    Neurologic: Normal       /68 (BP Location: Right arm, Patient Position: Sitting, Cuff Size: large)   Pulse 62   Resp 16   Ht 5' 5\" (1.651 m)   Wt 192 lb 12.8 oz (87.5 kg)   LMP 12/22/2009   SpO2 96%   BMI 32.08 kg/m²  Estimated body mass index is 32.08 kg/m² as calculated from the following:    Height as of this encounter: 5' 5\" (1.651 m).    Weight as of this encounter: 192 lb 12.8 oz (87.5 kg).    Assessment & Plan:   1. Screening mammogram for breast cancer (Primary)  -     [facility] BRENT 2D+3D SCREENING BILAT (CPT=77067/65505); Future; Expected date: 05/15/2024  2. Foot pain, right  -     Podiatry Referral  Other orders  -     Budesonide-Formoterol Fumarate; Inhale 2 puffs into the lungs 2 (two) times daily.  Dispense: 1 each; Refill: 0  -     Prevnar 20 (PCV20) [61501]  3. Asthma uncomplicated - stable   F/u in 2-3 month s      Return in about 3 months (around 8/15/2024).

## 2024-05-17 RX ORDER — BUDESONIDE AND FORMOTEROL FUMARATE DIHYDRATE 160; 4.5 UG/1; UG/1
2 AEROSOL RESPIRATORY (INHALATION) 2 TIMES DAILY
Qty: 30.6 G | Refills: 0 | OUTPATIENT
Start: 2024-05-17

## 2024-06-11 RX ORDER — MONTELUKAST SODIUM 10 MG/1
10 TABLET ORAL DAILY
Qty: 90 TABLET | Refills: 3 | Status: SHIPPED | OUTPATIENT
Start: 2024-06-11

## 2024-06-11 NOTE — TELEPHONE ENCOUNTER
Please review.  Protocol failed / Has no protocol.     Requested Prescriptions   Pending Prescriptions Disp Refills    MONTELUKAST 10 MG Oral Tab [Pharmacy Med Name: MONTELUKAST 10MG TABLETS] 90 tablet 3     Sig: TAKE 1 TABLET(10 MG) BY MOUTH DAILY       Asthma & COPD Medication Protocol Failed - 6/6/2024  4:14 PM        Failed - Asthma Action Score greater than or equal to 20        Failed - AAP/ACT given in last 12 months     No data recorded  No data recorded  No data recorded  No data recorded          Passed - Appointment in past 6 or next 3 months      Recent Outpatient Visits              3 weeks ago Screening mammogram for breast cancer    [facility], Radha Mata MD    Office Visit    9 months ago Anxiety    [facility], Radha Mata MD    Office Visit    1 year ago Heartburn    [facility], Sandrine Calero APRN    Office Visit    1 year ago Dermatitis    [facility], Yo Pritchard MD    Office Visit    1 year ago Dermatitis    [facility], Radha Mata MD    Telemedicine                           Recent Outpatient Visits              3 weeks ago Screening mammogram for breast cancer    [facility], Radha Mata MD    Office Visit    9 months ago [facility]    Office Visit    1 year ago Heartburn    [facility], Sandrine Calero APRN    Office Visit    1 year ago Dermatitis    [facility], Yo Pritchard MD    Office Visit    1 year ago Dermatitis    [facility], Radha Mata MD    Telemedicine

## 2024-12-05 RX ORDER — LEVOTHYROXINE SODIUM 25 UG/1
25 TABLET ORAL
Qty: 90 TABLET | Refills: 1 | Status: SHIPPED | OUTPATIENT
Start: 2024-12-05

## 2024-12-05 NOTE — TELEPHONE ENCOUNTER
Refill passed per [facility] protocol.     Requested Prescriptions   Pending Prescriptions Disp Refills    LEVOTHYROXINE 25 MCG Oral Tab [Pharmacy Med Name: LEVOTHYROXINE 0.025MG (25MCG) TAB] 90 tablet 1     Sig: TAKE 1 TABLET(25 MCG) BY MOUTH BEFORE BREAKFAST       Thyroid Medication Protocol Passed - 12/5/2024  8:13 AM        Passed - TSH in past 12 months        Passed - Last TSH value is normal     Lab Results   Component Value Date    TSH 3.235 05/15/2024                 Passed - In person appointment or virtual visit in the past 12 mos or appointment in next 3 mos     Recent Outpatient Visits              6 months ago Screening mammogram for breast cancer    [facility], Radha Mata MD    Office Visit    1 year ago Anxiety    [facility], Radha Mata MD    Office Visit    1 year ago Heartburn    [facility], Sandrine Calero APRN    Office Visit    1 year ago Dermatitis    [facility], Yo Pritchard MD    Office Visit    1 year ago Dermatitis    [facility], Radha Mata MD    Telemedicine

## 2025-01-14 RX ORDER — ALBUTEROL SULFATE 90 UG/1
2 INHALANT RESPIRATORY (INHALATION) EVERY 6 HOURS PRN
Qty: 54 G | Refills: 3 | Status: SHIPPED | OUTPATIENT
Start: 2025-01-14

## 2025-01-14 NOTE — TELEPHONE ENCOUNTER
Please Review. Protocol Failed; No Protocol     Requested Prescriptions   Pending Prescriptions Disp Refills    ALBUTEROL 108 (90 Base) MCG/ACT Inhalation Aero Soln [Pharmacy Med Name: Albuterol Sulfate Hfa 108 Mcg/Act Aer Lupi] 8.5 g 0     Sig: INHALE 2 PUFFS BY MOUTH INTO THE LUNGS EVERY 6 HOURS AS NEEDED FOR WHEEZING       Asthma & COPD Medication Protocol Failed - 1/14/2025  9:48 AM        Failed - ACT Score greater than or equal to 20                Failed - Appointment in past 6 or next 3 months      Recent Outpatient Visits              8 months ago Screening mammogram for breast cancer    [facility], Radha Mata MD    Office Visit    1 year ago [facility]    [facility]    1 year ago Heartburn    [facility], Sandrine Calero APRN    Office Visit    1 year ago Dermatitis    [facility], Yo Pritchard MD    Office Visit    1 year ago Dermatitis    [facility], Radha Mata MD    Telemedicine                      Failed - ACT recorded in the last 12 months                Passed - Medication is active on med list                 Recent Outpatient Visits              8 months ago [facility]    [facility]    [facility] year ago [facility]    [facility] Visit    1 year ago Heartburn    [facility], Sandrine Calero APRN    Office Visit    1 year ago [facility]    [facility]    1 year ago [facility]    Telemedicine

## 2025-05-22 RX ORDER — LEVOTHYROXINE SODIUM 25 UG/1
25 TABLET ORAL
Qty: 90 TABLET | Refills: 3 | Status: SHIPPED | OUTPATIENT
Start: 2025-05-22

## 2025-05-22 NOTE — TELEPHONE ENCOUNTER
Please review.  Protocol failed / Has no protocol.     [employer] message sent to patient to schedule an office visit with Primary Care Provider.   Will also route to Call Center to make a phone attempt.     Requested Prescriptions   Pending Prescriptions Disp Refills    levothyroxine 25 MCG Oral Tab 90 tablet 3     Sig: Take 1 tablet (25 mcg total) by mouth before breakfast.       Thyroid Medication Protocol Failed - 5/22/2025 10:14 AM        Failed - TSH in past 12 months        Failed - In person appointment or virtual visit in the past 12 mos or appointment in next 3 mos        Passed - Last TSH value is normal        Passed - Medication is active on med list

## (undated) NOTE — LETTER
11/10/2023              [facility] [address]         Dear Yahir Cool,    Our records indicate that the tests ordered for you by Apolonia Staley  have not been done. If you have, in fact, already completed the tests or you do not wish to have the tests done, please contact our office at [address] [place] [address]. Otherwise, please proceed with the testing. Enclosed is a duplicate order for your convenience. Labs Order:    Helicobacter Pylori Breath Test, Adult (Order #[id number]) on 8/7/23         Sincerely,    Apolonia Buchanan.  Jony Staley  [facility] [address]  [address], [address] [id number] [phone]

## (undated) NOTE — LETTER
02/08/24        Maribel Sorto  [address]  [facility] [address]      Estimado Maribel Sorto,    Nuestros registros indican que tiene análisis clínicos pendientes y/o pruebas que se ordenaron en barrientos nombre que no se guillen completado.  Helicobacter Pylori Breath Test, Adult (Order #[id number]) on 10/30/23     Para brindarle la mejor atención posible, por favor complete estos análisis/pruebas a la brevedad posible.    Si completó estos análisis/pruebas recientemente, por favor ignore esta carta.  Si tiene alguna pregunta, llame a la oficina al Dept: [phone].    Sandrine Maravilla, APRN

## (undated) NOTE — LETTER
May 31, 2023      [address] [place] [address] [address]      Dear Vandana Pascual:    Our office has been trying to contact you to discuss your recent test results. Please contact our office at your earliest convenience at [phone]. We would also like to inform you that we have received a fax from [employer] stating that insurance will only cover Levocetirizine once daily and not twice daily. So you may need to purchase extra over the counter. As always, thank you for selecting [facility] for your healthcare needs.     Sincerely,    Your Physician & Nursing Staff